# Patient Record
Sex: MALE | Race: WHITE | NOT HISPANIC OR LATINO | URBAN - METROPOLITAN AREA
[De-identification: names, ages, dates, MRNs, and addresses within clinical notes are randomized per-mention and may not be internally consistent; named-entity substitution may affect disease eponyms.]

---

## 2024-04-24 ENCOUNTER — LAB (OUTPATIENT)
Dept: URBAN - METROPOLITAN AREA CLINIC 111 | Facility: CLINIC | Age: 34
End: 2024-04-24

## 2024-04-24 ENCOUNTER — OV CON (OUTPATIENT)
Dept: URBAN - METROPOLITAN AREA CLINIC 111 | Facility: CLINIC | Age: 34
End: 2024-04-24
Payer: COMMERCIAL

## 2024-04-24 VITALS
HEIGHT: 71 IN | BODY MASS INDEX: 26.6 KG/M2 | TEMPERATURE: 99.7 F | WEIGHT: 190 LBS | SYSTOLIC BLOOD PRESSURE: 123 MMHG | DIASTOLIC BLOOD PRESSURE: 77 MMHG | HEART RATE: 83 BPM

## 2024-04-24 DIAGNOSIS — Z87.898 HISTORY OF ETOH ABUSE: ICD-10-CM

## 2024-04-24 DIAGNOSIS — R19.4 CHANGE IN BOWEL HABIT: ICD-10-CM

## 2024-04-24 DIAGNOSIS — R10.9 ABDOMINAL PAIN: ICD-10-CM

## 2024-04-24 DIAGNOSIS — K59.01 CONSTIPATION: ICD-10-CM

## 2024-04-24 DIAGNOSIS — R16.0 HEPATOMEGALY, NOT ELSEWHERE CLASSIFIED: ICD-10-CM

## 2024-04-24 DIAGNOSIS — K83.8 DILATED BILE DUCT: ICD-10-CM

## 2024-04-24 DIAGNOSIS — R74.01 ELEVATED ALT MEASUREMENT: ICD-10-CM

## 2024-04-24 DIAGNOSIS — K92.1 BLACK STOOL: ICD-10-CM

## 2024-04-24 DIAGNOSIS — R63.4 WEIGHT LOSS: ICD-10-CM

## 2024-04-24 PROCEDURE — 99245 OFF/OP CONSLTJ NEW/EST HI 55: CPT | Performed by: PHYSICIAN ASSISTANT

## 2024-04-24 RX ORDER — POLYETHYLENE GLYCOL 3350, SODIUM SULFATE ANHYDROUS, SODIUM BICARBONATE, SODIUM CHLORIDE, POTASSIUM CHLORIDE 236; 22.74; 6.74; 5.86; 2.97 G/4L; G/4L; G/4L; G/4L; G/4L
ML POWDER, FOR SOLUTION ORAL AS DIRECTED
Qty: 1 KIT | Refills: 0 | OUTPATIENT
Start: 2024-04-24 | End: 2024-04-26

## 2024-04-24 RX ORDER — SULFAMETHOXAZOLE AND TRIMETHOPRIM 400; 80 MG/1; MG/1
TABLET ORAL
Qty: 0 | Refills: 0 | Status: ON HOLD | COMMUNITY
Start: 1900-01-01

## 2024-04-24 RX ORDER — ACETAMINOPHEN 325 MG/1
TABLET, FILM COATED ORAL
Qty: 0 | Refills: 0 | Status: ON HOLD | COMMUNITY
Start: 1900-01-01

## 2024-04-24 NOTE — HPI-TODAY'S VISIT:
35 y/o male here with weight loss as well as multiple other symptoms and concerns. This patient was referred by Dr. Andres Recinos. A copy of this will be sent to the referring provider. Pt has [facility] financial assistance. Referred by oncology d/t weight loss and hepatosplenomegaly. Pt had EGD by Dr. Moreno in 2017 for abdominal pain with significant hemorrhagic gastritis. Path unimpressive. Pt was started on Pantoprazole 40 mg daily. He was also noted to have elevated LFTs and MRCP was planned. U/S in 2016 with normal liver and mild dilatation of CBD. There is a h/o ETOH abuse but pt quit drinking.  Pt is here with his wife. He reports he has enlarged lymph nodes and seeing oncology. He is supposed to get a biopsy. His 5 year old son just beat leukemia. Pt has lost around 80 lbs in 4 months without trying. He reports severe constipation. He has been taking multiple stool laxatives and stool softeners with no significant improvement (Docusate, Senna, Miralax, Biscodyl, and Magnesium citrate). His baseline was stool daily. He reports constipation since December and enlarged lymph nodes starting then too. He also reports severe bloating. Colon cancer and intestinal cancer in dad's side. Not first-degree relatives though. Pt has had diffuse abdominal pain that worsens if he does not stool. He is having a stool every 4-5 days with all the laxatives above. Stool has been black for 2 months. No Pepto or iron supplements. He also reports food comes out undigested. He has also seen red blood at times. He reports he had strep and flu last week. Treated with antibiotics. No dysphagia. Reports decreased appetite.  No ETOH in 12 years. No previous colonoscopy. He has had PET and CT scan as well as a lot of bloodwork. He states "lymphoma panel was fine." He reports Cr and liver numbers are high. Labs from 4/19: Cr WNL and ALT 54. Other liver numbers WNL. H/H WNL in March. No heart, lung, or kidney problems. Pt reports occasional use of NSAIDs.  CT a/p 3/18 revealing choledochal distention with suggestion of distal choledocholithiasis/sludge, prominent liver/spleen, and stool-packed/constipated appearance of colon. PET scan on 4/3 revealing mild hepatomegaly. Spleen noted to be normal and no other abnormal findings noted.

## 2024-05-01 ENCOUNTER — P2P PATIENT RECORD (OUTPATIENT)
Age: 34
End: 2024-05-01

## 2024-05-02 ENCOUNTER — LAB OUTSIDE AN ENCOUNTER (OUTPATIENT)
Dept: URBAN - METROPOLITAN AREA CLINIC 23 | Facility: CLINIC | Age: 34
End: 2024-05-02

## 2024-05-23 ENCOUNTER — LAB OUTSIDE AN ENCOUNTER (OUTPATIENT)
Dept: URBAN - METROPOLITAN AREA CLINIC 23 | Facility: CLINIC | Age: 34
End: 2024-05-23

## 2024-05-23 ENCOUNTER — OFFICE VISIT (OUTPATIENT)
Dept: URBAN - METROPOLITAN AREA MEDICAL CENTER 27 | Facility: MEDICAL CENTER | Age: 34
End: 2024-05-23
Payer: COMMERCIAL

## 2024-05-23 DIAGNOSIS — K31.89 OTHER DISEASES OF STOMACH AND DUODENUM: ICD-10-CM

## 2024-05-23 DIAGNOSIS — K29.60 ADENOPAPILLOMATOSIS GASTRICA: ICD-10-CM

## 2024-05-23 DIAGNOSIS — R19.7 ACUTE DIARRHEA: ICD-10-CM

## 2024-05-23 LAB
GLUCOSE POC: 83
PERFORMING LAB: (no result)

## 2024-05-23 PROCEDURE — 45380 COLONOSCOPY AND BIOPSY: CPT | Performed by: INTERNAL MEDICINE

## 2024-05-23 PROCEDURE — 43239 EGD BIOPSY SINGLE/MULTIPLE: CPT | Performed by: INTERNAL MEDICINE

## 2024-05-23 RX ORDER — PANTOPRAZOLE SODIUM 40 MG/1
1 TABLET TABLET, DELAYED RELEASE ORAL ONCE A DAY
Qty: 30 | Refills: 1 | Status: ACTIVE | COMMUNITY
Start: 2024-04-29

## 2024-05-23 RX ORDER — SULFAMETHOXAZOLE AND TRIMETHOPRIM 400; 80 MG/1; MG/1
TABLET ORAL
Qty: 0 | Refills: 0 | Status: ON HOLD | COMMUNITY
Start: 1900-01-01

## 2024-05-23 RX ORDER — ACETAMINOPHEN 325 MG/1
TABLET, FILM COATED ORAL
Qty: 0 | Refills: 0 | Status: ON HOLD | COMMUNITY
Start: 1900-01-01

## 2024-05-28 ENCOUNTER — TELEPHONE ENCOUNTER (OUTPATIENT)
Dept: URBAN - METROPOLITAN AREA CLINIC 23 | Facility: CLINIC | Age: 34
End: 2024-05-28

## 2024-05-29 ENCOUNTER — OFFICE VISIT (OUTPATIENT)
Dept: URBAN - METROPOLITAN AREA TELEHEALTH 2 | Facility: TELEHEALTH | Age: 34
End: 2024-05-29
Payer: COMMERCIAL

## 2024-05-29 DIAGNOSIS — K90.0 CELIAC DISEASE: ICD-10-CM

## 2024-05-29 PROCEDURE — 97802 MEDICAL NUTRITION INDIV IN: CPT | Performed by: DIETITIAN, REGISTERED

## 2024-05-29 RX ORDER — ACETAMINOPHEN 325 MG/1
TABLET, FILM COATED ORAL
Qty: 0 | Refills: 0 | Status: ON HOLD | COMMUNITY
Start: 1900-01-01

## 2024-05-29 RX ORDER — SULFAMETHOXAZOLE AND TRIMETHOPRIM 400; 80 MG/1; MG/1
TABLET ORAL
Qty: 0 | Refills: 0 | Status: ON HOLD | COMMUNITY
Start: 1900-01-01

## 2024-05-29 RX ORDER — PANTOPRAZOLE SODIUM 40 MG/1
1 TABLET TABLET, DELAYED RELEASE ORAL ONCE A DAY
Qty: 30 | Refills: 1 | Status: ACTIVE | COMMUNITY
Start: 2024-04-29

## 2024-06-10 ENCOUNTER — OFFICE VISIT (OUTPATIENT)
Dept: URBAN - METROPOLITAN AREA CLINIC 111 | Facility: CLINIC | Age: 34
End: 2024-06-10

## 2024-06-10 RX ORDER — ACETAMINOPHEN 325 MG/1
TABLET, FILM COATED ORAL
Qty: 0 | Refills: 0 | Status: ON HOLD | COMMUNITY
Start: 1900-01-01

## 2024-06-10 RX ORDER — PANTOPRAZOLE SODIUM 40 MG/1
1 TABLET TABLET, DELAYED RELEASE ORAL ONCE A DAY
Qty: 30 | Refills: 1 | Status: ACTIVE | COMMUNITY
Start: 2024-04-29

## 2024-06-10 RX ORDER — SULFAMETHOXAZOLE AND TRIMETHOPRIM 400; 80 MG/1; MG/1
TABLET ORAL
Qty: 0 | Refills: 0 | Status: ON HOLD | COMMUNITY
Start: 1900-01-01

## 2024-06-10 NOTE — HPI-TODAY'S VISIT:
33 y/o male here to follow up after scopes. Seen by me on 4/24 for weight loss, black stool, constipation/change in bowel habits, abdominal pain, elevated ALT, and dilated bile duct. MRI liver/MRCP was ordered and done on 5/2 with hepatomegaly and mildly dilated CBD and prominent PD. No acute findings noted. Pt also has h/o ETOH abuse and substance abuse. S/p EGD and colon by Dr. Hagen on 5/23. EGD looked normal but path revealed Celiac disease. Colon was normal but there was also a lot of stool in sigmoid. Pt was told to follow a GF diet and see nutritionist. Pt saw Lisa on 5/29.

## 2024-06-20 ENCOUNTER — DASHBOARD ENCOUNTERS (OUTPATIENT)
Age: 34
End: 2024-06-20

## 2024-06-20 ENCOUNTER — OFFICE VISIT (OUTPATIENT)
Dept: URBAN - METROPOLITAN AREA CLINIC 111 | Facility: CLINIC | Age: 34
End: 2024-06-20
Payer: COMMERCIAL

## 2024-06-20 VITALS
HEART RATE: 114 BPM | SYSTOLIC BLOOD PRESSURE: 145 MMHG | TEMPERATURE: 98.1 F | WEIGHT: 183.6 LBS | BODY MASS INDEX: 25.7 KG/M2 | HEIGHT: 71 IN | DIASTOLIC BLOOD PRESSURE: 86 MMHG

## 2024-06-20 DIAGNOSIS — R19.7 DIARRHEA: ICD-10-CM

## 2024-06-20 DIAGNOSIS — R14.0 BLOATING: ICD-10-CM

## 2024-06-20 PROCEDURE — 99214 OFFICE O/P EST MOD 30 MIN: CPT | Performed by: PHYSICIAN ASSISTANT

## 2024-06-20 RX ORDER — ACETAMINOPHEN 325 MG/1
TABLET, FILM COATED ORAL
Qty: 0 | Refills: 0 | Status: ON HOLD | COMMUNITY
Start: 1900-01-01

## 2024-06-20 RX ORDER — PANTOPRAZOLE SODIUM 40 MG/1
1 TABLET TABLET, DELAYED RELEASE ORAL ONCE A DAY
Qty: 30 | Refills: 1 | Status: ACTIVE | COMMUNITY
Start: 2024-04-29

## 2024-06-20 RX ORDER — SULFAMETHOXAZOLE AND TRIMETHOPRIM 400; 80 MG/1; MG/1
TABLET ORAL
Qty: 0 | Refills: 0 | Status: ON HOLD | COMMUNITY
Start: 1900-01-01

## 2024-06-20 NOTE — HPI-TODAY'S VISIT:
35 y/o male here to follow up and c/o diarrhea. Seen by me on 4/24 for weight loss, black stool, constipation/change in bowel habits, abdominal pain, elevated ALT, and dilated bile duct. MRI liver/MRCP was ordered and done on 5/2 with hepatomegaly and mildly dilated CBD and prominent PD. No acute findings noted. Pt also has h/o ETOH abuse and substance abuse. S/p EGD and colon by Dr. Hagen on 5/23. EGD looked normal but path revealed Celiac disease. Colon was normal but there was also a lot of stool in sigmoid. Pt was told to follow a GF diet and see nutritionist. Pt saw Lisa on 5/29.  Pt is here with his wife. He had a recent U/S with results pending. He had labs since seeing me and reports LFTs were in 30s. ALT was in 50s in April. He is eating a GF diet and doing low fodmap. He is still getting abdominal pain and bloating after eating. Low fodmap has helped some. Down 7 lbs since last visit. He was constipated when I saw him but reports since colonoscopy he has been having diarrhea. No pancreas history. He saw oncology again and reports he does not have cancer.   Previous: No ETOH in 12 years. CT a/p 3/18 revealing choledochal distention with suggestion of distal choledocholithiasis/sludge, prominent liver/spleen, and stool-packed/constipated appearance of colon. PET scan on 4/3 revealing mild hepatomegaly. Spleen noted to be normal and no other abnormal findings noted. (4) excellent

## 2024-07-31 ENCOUNTER — OFFICE VISIT (OUTPATIENT)
Dept: URBAN - METROPOLITAN AREA CLINIC 19 | Facility: CLINIC | Age: 34
End: 2024-07-31

## 2024-07-31 NOTE — HPI-TODAY'S VISIT:
7/31/2024: Javier: 34-year-old male seen by another [facility] provider in June presents for follow-up with me.  Last presentation he had symptoms of diarrhea.  Status post EGD and colonoscopy with Dr. Hagen in May 2024 with celiac disease based on biopsies.  Normal colonoscopy except for increased stool noted.  Proceeded to adhere to a gluten-free diet and followed up with a nutritionist.  Despite the low FODMAP and gluten-free diet he still had symptoms of abdominal pain and bloating.  Rule out for infection and EPI ordered during last clinic visit.  Today's concerns are as follows  =========== 6/20/2024: VICKY Blancas: 35 y/o male here to follow up and c/o diarrhea. Seen by me on 4/24 for weight loss, black stool, constipation/change in bowel habits, abdominal pain, elevated ALT, and dilated bile duct. MRI liver/MRCP was ordered and done on 5/2 with hepatomegaly and mildly dilated CBD and prominent PD. No acute findings noted. Pt also has h/o ETOH abuse and substance abuse. S/p EGD and colon by Dr. Hagen on 5/23. EGD looked normal but path revealed Celiac disease. Colon was normal but there was also a lot of stool in sigmoid. Pt was told to follow a GF diet and see nutritionist. Pt saw Lisa on 5/29.  Pt is here with his wife. He had a recent U/S with results pending. He had labs since seeing me and reports LFTs were in 30s. ALT was in 50s in April. He is eating a GF diet and doing low fodmap. He is still getting abdominal pain and bloating after eating. Low fodmap has helped some. Down 7 lbs since last visit. He was constipated when I saw him but reports since colonoscopy he has been having diarrhea. No pancreas history. He saw oncology again and reports he does not have cancer.   Previous: No ETOH in 12 years. CT a/p 3/18 revealing choledochal distention with suggestion of distal choledocholithiasis/sludge, prominent liver/spleen, and stool-packed/constipated appearance of colon. PET scan on 4/3 revealing mild hepatomegaly. Spleen noted to be normal and no other abnormal findings noted.  Plan: Fecal elastase, stool studies and sucrose isomaltase deficiency test ============

## 2024-09-05 ENCOUNTER — OFFICE VISIT (OUTPATIENT)
Dept: URBAN - METROPOLITAN AREA CLINIC 19 | Facility: CLINIC | Age: 34
End: 2024-09-05

## 2024-09-05 RX ORDER — PANTOPRAZOLE SODIUM 40 MG/1
1 TABLET TABLET, DELAYED RELEASE ORAL ONCE A DAY
Qty: 30 | Refills: 1 | Status: ACTIVE | COMMUNITY
Start: 2024-04-29

## 2024-09-05 RX ORDER — ACETAMINOPHEN 325 MG/1
TABLET, FILM COATED ORAL
Qty: 0 | Refills: 0 | Status: ON HOLD | COMMUNITY
Start: 1900-01-01

## 2024-09-05 RX ORDER — SULFAMETHOXAZOLE AND TRIMETHOPRIM 400; 80 MG/1; MG/1
TABLET ORAL
Qty: 0 | Refills: 0 | Status: ON HOLD | COMMUNITY
Start: 1900-01-01

## 2024-09-05 NOTE — HPI-TODAY'S VISIT:
9/5/2024: Javier: 34-year-old male seen by another [facility] provider in June presents for follow-up with me.  Last presentation he had symptoms of diarrhea.  Status post EGD and colonoscopy with Dr. Hagen in May 2024 with celiac disease based on biopsies.  Normal colonoscopy except for increased stool noted.  Proceeded to adhere to a gluten-free diet and followed up with a nutritionist.  Despite the low FODMAP and gluten-free diet he still had symptoms of abdominal pain and bloating.  Rule out for infection and EPI ordered during last clinic visit.  Today's concerns are as follows  =========== 6/20/2024: VICKY Blancas: 35 y/o male here to follow up and c/o diarrhea. Seen by me on 4/24 for weight loss, black stool, constipation/change in bowel habits, abdominal pain, elevated ALT, and dilated bile duct. MRI liver/MRCP was ordered and done on 5/2 with hepatomegaly and mildly dilated CBD and prominent PD. No acute findings noted. Pt also has h/o ETOH abuse and substance abuse. S/p EGD and colon by Dr. Hagen on 5/23. EGD looked normal but path revealed Celiac disease. Colon was normal but there was also a lot of stool in sigmoid. Pt was told to follow a GF diet and see nutritionist. Pt saw Lisa on 5/29.  Pt is here with his wife. He had a recent U/S with results pending. He had labs since seeing me and reports LFTs were in 30s. ALT was in 50s in April. He is eating a GF diet and doing low fodmap. He is still getting abdominal pain and bloating after eating. Low fodmap has helped some. Down 7 lbs since last visit. He was constipated when I saw him but reports since colonoscopy he has been having diarrhea. No pancreas history. He saw oncology again and reports he does not have cancer.   Previous: No ETOH in 12 years. CT a/p 3/18 revealing choledochal distention with suggestion of distal choledocholithiasis/sludge, prominent liver/spleen, and stool-packed/constipated appearance of colon. PET scan on 4/3 revealing mild hepatomegaly. Spleen noted to be normal and no other abnormal findings noted.  Plan: Fecal elastase, stool studies and sucrose isomaltase deficiency test ============